# Patient Record
Sex: MALE | Race: WHITE | Employment: FULL TIME | URBAN - METROPOLITAN AREA
[De-identification: names, ages, dates, MRNs, and addresses within clinical notes are randomized per-mention and may not be internally consistent; named-entity substitution may affect disease eponyms.]

---

## 2023-02-15 ENCOUNTER — OFFICE VISIT (OUTPATIENT)
Dept: ORTHOPEDIC SURGERY | Age: 28
End: 2023-02-15

## 2023-02-15 VITALS — HEIGHT: 75 IN | BODY MASS INDEX: 34.82 KG/M2 | WEIGHT: 280 LBS

## 2023-02-15 DIAGNOSIS — S99.911A INJURY OF RIGHT ANKLE, INITIAL ENCOUNTER: ICD-10-CM

## 2023-02-15 DIAGNOSIS — M25.571 RIGHT ANKLE PAIN, UNSPECIFIED CHRONICITY: Primary | ICD-10-CM

## 2023-02-15 NOTE — PROGRESS NOTES
Name: Lakesha العراقي  YOB: 1995  Gender: male  MRN: [id number]     CC: Right ankle injury    HPI:   01/01/2023: He slipped off a log into a creek describing a hyperextension right ankle injury. Short course of 3D boot and just started using an ASO brace. 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 He has quit using smokeless tobacco.     Physical Examination:  Patient appears to be alert and oriented with acceptable appearance. No obvious distress or SOB  CV: appears to have acceptable vascular color and capillary refill  Neuro: appears to have mostly intact light touch sensation   Skin: No gross soft tissue swelling  MS: Standing: Plantigrade: Gait full  Right = posterior ankle/hindfoot pain; no Achilles pain; no Achilles thickening; no Achilles gap  Right = anterior lateral ankle/hindfoot pain  Right = good ankle/foot motor; 5/5 strength; no gross instability or crepitance    XR: Right side: Standing AP lateral mortise ankle plus AP oblique foot taken today with some thickening in the posterior lateral tibia but no gross syndesmotic disruption; ACP appears intact; no TMT concerns; middle cuneiform subchondral lucency;  XR Impression:  As above      Assessment:    Right ankle hyperextension injury    Plan:   The patient and I discussed the above assessment. We explored treatment options.      His radiographs suggest a possible posterior lateral tibia/malleolar fracture  His x-rays reveal no mortise or syndesmosis shift  Plan is MRI scan to further define his injury    Advanced medical imaging: Right ankle MRI scan: 01/01/2023 hyperextension ankle injury: Plain film x-rays suggest questionable healed posterior lateral tibia fracture; assess for capsular damage/tearing anterior and posterior ankle; assess anterior talar compression injury    DME: He reports having a 3D boot but no longer using. He reports having an ASO brace that he just started using  We discussed ankle care and brace protection  PT: PT will be determined after MRI scan    Medication - OTC meds prn:   Surgical discussion: We discussed surgical potential but no indications today  Follow up: After MRI scan  Work status: He prefers to continue regular work     This note was created using Dragon voice recognition software which may result in errors of speech and spelling recognition and word/phrase syntax errors.

## 2023-03-10 ENCOUNTER — TELEPHONE (OUTPATIENT)
Dept: ORTHOPEDIC SURGERY | Age: 28
End: 2023-03-10

## 2023-03-10 DIAGNOSIS — M25.571 RIGHT ANKLE PAIN, UNSPECIFIED CHRONICITY: Primary | ICD-10-CM

## 2023-03-10 DIAGNOSIS — S99.911A INJURY OF RIGHT ANKLE, INITIAL ENCOUNTER: ICD-10-CM

## 2023-03-10 NOTE — TELEPHONE ENCOUNTER
He is having a MRI on his right ankle in a couple of weeks. He is wondering how [facility] would feel about him doing PT in the meantime. He would like to know who [facility] would recommend that would really help him. He lives next to [facility].

## 2023-03-10 NOTE — TELEPHONE ENCOUNTER
Called and spoke with pt. Pt was informed Dr. Candis Azevedo would make a decsion on PT once he got his MRI results. Pt stated he was not sure if he would like to proceed with the MRI, due to financial reason at this time. Pt would like to see if PT would help him. Per Dr. Candis Azevedo, he is ok with pt doing PT first. He would suggest [facility] in [place] [address]. Pt voiced understanding and was informed PT referral will be faxed to them.      Orders Placed This Encounter   Procedures    Amb External Referral To Physical Therapy     Referral Priority:   Routine     Referral Reason:   Patient Preference     Requested Specialty:   Physical Therapist     Number of Visits Requested:   1

## 2023-03-29 ENCOUNTER — OFFICE VISIT (OUTPATIENT)
Dept: ORTHOPEDIC SURGERY | Age: 28
End: 2023-03-29

## 2023-03-29 DIAGNOSIS — S99.911S INJURY OF RIGHT ANKLE, SEQUELA: Primary | ICD-10-CM

## 2023-03-29 NOTE — LETTER
March 29, 2023       Julio Linares YOB: 1995   Torres Mast Date of Visit:  3/29/2023       To Whom It May Concern: It is my medical opinion that Joleen Horton {Work release (duty restriction):77084}. If you have any questions or concerns, please don't hesitate to call.     Sincerely,        Miguelangel Charles MD

## 2023-03-29 NOTE — PROGRESS NOTES
Name: Felipe Renteria  YOB: 1995  Gender: male  MRN: [id number]     03/29/2023: Presents to discuss MRI scan. He is better. Complaining of anterior lateral ankle pain    HPI:   01/01/2023: He slipped off a log into a creek describing a hyperextension right ankle injury. Short course of 3D boot and just started using an ASO brace. 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 He has quit using smokeless tobacco.     Physical Examination:  Patient appears to be alert and oriented with acceptable appearance. No obvious distress or SOB  CV: appears to have acceptable vascular color and capillary refill  Neuro: appears to have mostly intact light touch sensation   Skin: No gross soft tissue swelling  MS: Standing: Plantigrade: Gait full   Right = anterior lateral ankle/hindfoot pain  Right = good ankle/foot motor; 5/5 strength; no gross instability or crepitance    XR: Right side: Standing AP lateral mortise ankle plus AP oblique foot taken 02/15/2023 with some thickening in the posterior lateral tibia but no gross syndesmotic disruption; ACP appears intact; no TMT concerns; middle cuneiform subchondral lucency;  XR Impression:  As above      03/27/2023: Right ankle MRI scan without contrast: Radiologic impression:  1. High ankle sprain with additional sprains of the anterior and posterior talofibular and calcaneofibular ligaments  2. No evidence for acute or chronic fracture. Marrow edema along the posterior malleolus likely representing contusion or reactive to ligamentous injury. 3.  No discrete osteochondral injury  4.   Under findings section radiologist mentions thickening and increased signal within the syndesmotic interosseous ligament as well as anterior and posterior inferior tibiofibular ligaments

## 2023-05-18 ENCOUNTER — OFFICE VISIT (OUTPATIENT)
Dept: ORTHOPEDIC SURGERY | Age: 28
End: 2023-05-18

## 2023-05-18 VITALS — BODY MASS INDEX: 34.82 KG/M2 | HEIGHT: 75 IN | WEIGHT: 280 LBS

## 2023-05-18 DIAGNOSIS — S99.911S INJURY OF RIGHT ANKLE, SEQUELA: Primary | ICD-10-CM

## 2023-05-18 NOTE — PROGRESS NOTES
interosseous ligament as well as anterior and posterior inferior tibiofibular ligaments compatible with high ankle sprain. The transverse tibiofibular ligament is intact. Assessment:    Right ankle hyperextension injury with high ankle sprain    Plan:   The patient and I discussed the above assessment. We explored treatment options. He completed PT with [facility] and is asymptomatic and fully functioning with no concerns or pain  He has been doing a side job with no limitations or pain  He was able to hike with no brace or protection with no limitations or pain  Despite the MRI scan confirming significant high ankle sprain with syndesmotic involvement, he is asymptomatic and prefers no intervention    Medication - OTC meds prn:   Surgical discussion: He understands potential future ankle and syndesmotic reconstruction surgery but at this time he is content with no intervention    Follow up: As needed  Work status: Beginning 06/01/2023: Regular work with no restrictions    This note was created using Dragon voice recognition software which may result in errors of speech and spelling recognition and word/phrase syntax errors.